# Patient Record
Sex: FEMALE | Race: WHITE
[De-identification: names, ages, dates, MRNs, and addresses within clinical notes are randomized per-mention and may not be internally consistent; named-entity substitution may affect disease eponyms.]

---

## 2020-05-21 ENCOUNTER — HOSPITAL ENCOUNTER (INPATIENT)
Dept: HOSPITAL 76 - ED | Age: 75
LOS: 1 days | DRG: 871 | End: 2020-05-22
Attending: INTERNAL MEDICINE | Admitting: INTERNAL MEDICINE
Payer: MEDICARE

## 2020-05-21 ENCOUNTER — HOSPITAL ENCOUNTER (OUTPATIENT)
Dept: HOSPITAL 76 - EMS | Age: 75
Discharge: ANOTHER HOSPITAL | End: 2020-05-21
Attending: SURGERY
Payer: MEDICARE

## 2020-05-21 DIAGNOSIS — I45.10: ICD-10-CM

## 2020-05-21 DIAGNOSIS — R79.89: ICD-10-CM

## 2020-05-21 DIAGNOSIS — E87.1: ICD-10-CM

## 2020-05-21 DIAGNOSIS — N17.9: ICD-10-CM

## 2020-05-21 DIAGNOSIS — R65.21: ICD-10-CM

## 2020-05-21 DIAGNOSIS — Z91.81: ICD-10-CM

## 2020-05-21 DIAGNOSIS — M21.371: ICD-10-CM

## 2020-05-21 DIAGNOSIS — Z66: ICD-10-CM

## 2020-05-21 DIAGNOSIS — L89.153: ICD-10-CM

## 2020-05-21 DIAGNOSIS — J98.11: ICD-10-CM

## 2020-05-21 DIAGNOSIS — E87.2: ICD-10-CM

## 2020-05-21 DIAGNOSIS — R57.1: ICD-10-CM

## 2020-05-21 DIAGNOSIS — Z74.09: ICD-10-CM

## 2020-05-21 DIAGNOSIS — L89.159: ICD-10-CM

## 2020-05-21 DIAGNOSIS — L89.152: ICD-10-CM

## 2020-05-21 DIAGNOSIS — I10: ICD-10-CM

## 2020-05-21 DIAGNOSIS — R06.00: ICD-10-CM

## 2020-05-21 DIAGNOSIS — A40.8: Primary | ICD-10-CM

## 2020-05-21 DIAGNOSIS — M62.82: ICD-10-CM

## 2020-05-21 DIAGNOSIS — A40.0: ICD-10-CM

## 2020-05-21 DIAGNOSIS — Z87.891: ICD-10-CM

## 2020-05-21 DIAGNOSIS — R53.1: Primary | ICD-10-CM

## 2020-05-21 DIAGNOSIS — K44.9: ICD-10-CM

## 2020-05-21 DIAGNOSIS — R00.1: ICD-10-CM

## 2020-05-21 DIAGNOSIS — N39.0: ICD-10-CM

## 2020-05-21 LAB
ALBUMIN DIAFP-MCNC: 3.1 G/DL (ref 3.2–5.5)
ALBUMIN/GLOB SERPL: 0.9 {RATIO} (ref 1–2.2)
ALP SERPL-CCNC: 77 IU/L (ref 42–121)
ALT SERPL W P-5'-P-CCNC: 136 IU/L (ref 10–60)
AMPHET UR QL SCN: NEGATIVE
ANION GAP SERPL CALCULATED.4IONS-SCNC: 15 MMOL/L (ref 6–13)
APTT PPP: 30.1 SECS (ref 24.9–33.3)
AST SERPL W P-5'-P-CCNC: 458 IU/L (ref 10–42)
BASOPHILS NFR BLD AUTO: 0.1 10^3/UL (ref 0–0.1)
BASOPHILS NFR BLD AUTO: 0.5 %
BENZODIAZ UR QL SCN: NEGATIVE
BILIRUB BLD-MCNC: 1.4 MG/DL (ref 0.2–1)
BILIRUB UR QL CFM: NEGATIVE
BUN SERPL-MCNC: 31 MG/DL (ref 6–20)
CALCIUM UR-MCNC: 8.6 MG/DL (ref 8.5–10.3)
CASTS URNS QL MICRO: (no result) /LPF
CHLORIDE SERPL-SCNC: 97 MMOL/L (ref 101–111)
CK SERPL-CCNC: (no result) IU/L (ref 22–269)
CLARITY UR REFRACT.AUTO: (no result)
CO2 SERPL-SCNC: 18 MMOL/L (ref 21–32)
COCAINE UR-SCNC: NEGATIVE UMOL/L
CREAT SERPLBLD-SCNC: 1.5 MG/DL (ref 0.4–1)
EOSINOPHIL # BLD AUTO: 0.1 10^3/UL (ref 0–0.7)
EOSINOPHIL NFR BLD AUTO: 0.3 %
ERYTHROCYTE [DISTWIDTH] IN BLOOD BY AUTOMATED COUNT: 18.8 % (ref 12–15)
GLOBULIN SER-MCNC: 3.4 G/DL (ref 2.1–4.2)
GLUCOSE SERPL-MCNC: 122 MG/DL (ref 70–100)
GLUCOSE UR QL STRIP.AUTO: NEGATIVE MG/DL
HGB UR QL STRIP: 12.7 G/DL (ref 12–16)
INR PPP: 1.5 (ref 0.8–1.2)
KETONES UR QL STRIP.AUTO: (no result) MG/DL
LIPASE SERPL-CCNC: 28 U/L (ref 22–51)
LYMPHOCYTES # SPEC AUTO: 0.6 10^3/UL (ref 1.5–3.5)
LYMPHOCYTES NFR BLD AUTO: 1.8 %
MCH RBC QN AUTO: 24.2 PG (ref 27–31)
MCHC RBC AUTO-ENTMCNC: 32.8 G/DL (ref 32–36)
MCV RBC AUTO: 73.9 FL (ref 81–99)
METHADONE UR QL SCN: NEGATIVE
METHAMPHET UR QL SCN: NEGATIVE
MONOCYTES # BLD AUTO: 1 10^3/UL (ref 0–1)
MONOCYTES NFR BLD AUTO: 3.4 %
NEUTROPHILS # BLD AUTO: 28.5 10^3/UL (ref 1.5–6.6)
NEUTROPHILS # SNV AUTO: 30.6 X10^3/UL (ref 4.8–10.8)
NEUTROPHILS NFR BLD AUTO: 93.1 %
NITRITE UR QL STRIP.AUTO: NEGATIVE
OPIATES UR QL SCN: NEGATIVE
PDW BLD AUTO: 8.9 FL (ref 7.9–10.8)
PH UR STRIP.AUTO: 6.5 PH (ref 5–7.5)
PLAT MORPH BLD: (no result)
PLATELET # BLD: 485 10^3/UL (ref 130–450)
PLATELET BLD QL SMEAR: (no result)
PROT SPEC-MCNC: 6.5 G/DL (ref 6.7–8.2)
PROT UR STRIP.AUTO-MCNC: 100 MG/DL
PROTHROM ACT/NOR PPP: 17.2 SECS (ref 9.9–12.6)
RBC # UR STRIP.AUTO: (no result) /UL
RBC # URNS HPF: (no result) /HPF (ref 0–5)
RBC MAR: 5.24 10^6/UL (ref 4.2–5.4)
RBC MORPH BLD: (no result)
SODIUM SERPLBLD-SCNC: 130 MMOL/L (ref 135–145)
SP GR UR STRIP.AUTO: 1.01 (ref 1–1.03)
SQUAMOUS URNS QL MICRO: (no result)
UROBILINOGEN UR QL STRIP.AUTO: (no result) E.U./DL
UROBILINOGEN UR STRIP.AUTO-MCNC: NEGATIVE MG/DL
VOLATILE DRUGS POS SERPL SCN: (no result)

## 2020-05-21 PROCEDURE — 96365 THER/PROPH/DIAG IV INF INIT: CPT

## 2020-05-21 PROCEDURE — 74018 RADEX ABDOMEN 1 VIEW: CPT

## 2020-05-21 PROCEDURE — 93306 TTE W/DOPPLER COMPLETE: CPT

## 2020-05-21 PROCEDURE — 83605 ASSAY OF LACTIC ACID: CPT

## 2020-05-21 PROCEDURE — 93005 ELECTROCARDIOGRAM TRACING: CPT

## 2020-05-21 PROCEDURE — 85025 COMPLETE CBC W/AUTO DIFF WBC: CPT

## 2020-05-21 PROCEDURE — 87086 URINE CULTURE/COLONY COUNT: CPT

## 2020-05-21 PROCEDURE — 80048 BASIC METABOLIC PNL TOTAL CA: CPT

## 2020-05-21 PROCEDURE — 84484 ASSAY OF TROPONIN QUANT: CPT

## 2020-05-21 PROCEDURE — 85730 THROMBOPLASTIN TIME PARTIAL: CPT

## 2020-05-21 PROCEDURE — 99291 CRITICAL CARE FIRST HOUR: CPT

## 2020-05-21 PROCEDURE — 99285 EMERGENCY DEPT VISIT HI MDM: CPT

## 2020-05-21 PROCEDURE — 36415 COLL VENOUS BLD VENIPUNCTURE: CPT

## 2020-05-21 PROCEDURE — 94770: CPT

## 2020-05-21 PROCEDURE — 96368 THER/DIAG CONCURRENT INF: CPT

## 2020-05-21 PROCEDURE — 80306 DRUG TEST PRSMV INSTRMNT: CPT

## 2020-05-21 PROCEDURE — 83735 ASSAY OF MAGNESIUM: CPT

## 2020-05-21 PROCEDURE — 80053 COMPREHEN METABOLIC PANEL: CPT

## 2020-05-21 PROCEDURE — 82550 ASSAY OF CK (CPK): CPT

## 2020-05-21 PROCEDURE — 71045 X-RAY EXAM CHEST 1 VIEW: CPT

## 2020-05-21 PROCEDURE — 87181 SC STD AGAR DILUTION PER AGT: CPT

## 2020-05-21 PROCEDURE — 80320 DRUG SCREEN QUANTALCOHOLS: CPT

## 2020-05-21 PROCEDURE — 87040 BLOOD CULTURE FOR BACTERIA: CPT

## 2020-05-21 PROCEDURE — 81001 URINALYSIS AUTO W/SCOPE: CPT

## 2020-05-21 PROCEDURE — 83690 ASSAY OF LIPASE: CPT

## 2020-05-21 PROCEDURE — 94002 VENT MGMT INPAT INIT DAY: CPT

## 2020-05-21 PROCEDURE — 83880 ASSAY OF NATRIURETIC PEPTIDE: CPT

## 2020-05-21 PROCEDURE — 85610 PROTHROMBIN TIME: CPT

## 2020-05-21 PROCEDURE — 81003 URINALYSIS AUTO W/O SCOPE: CPT

## 2020-05-21 PROCEDURE — 82803 BLOOD GASES ANY COMBINATION: CPT

## 2020-05-21 PROCEDURE — 87150 DNA/RNA AMPLIFIED PROBE: CPT

## 2020-05-21 PROCEDURE — 87077 CULTURE AEROBIC IDENTIFY: CPT

## 2020-05-21 PROCEDURE — 84100 ASSAY OF PHOSPHORUS: CPT

## 2020-05-21 PROCEDURE — 70450 CT HEAD/BRAIN W/O DYE: CPT

## 2020-05-21 NOTE — ED PHYSICIAN DOCUMENTATION
History of Present Illness





- Stated complaint


Stated Complaint: GLF, WEAKNESS





- Chief complaint


Chief Complaint: General





- History obtained from


History obtained from: Patient, Family (Primarily history is obtained from EMS 

as well as the patient and the patient's daughter.  The patient is a 75-year-old

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 

injury for which she uses a walking boot her baseline ability of mobility is to 

ambulate with a walker.  She does live at home by herselfWas walking with her 

walker when she slipped and fell down she remained on the floor for 

approximately 16 hours or so prior to arrival the patient and then called her 

daughter finally who did call the ambulance eventually brought her here to the 

emergency department. EMS and the daughter and the patient denies any recent 

believe that she had any long bone fracture such as a hip or upper extremity 

fracture.  At the daughter reports that she has a chronic decubitus ulcer that 

is never been addressed medically that usually about the size of a nickel or a 

quarter and reports it is eccentrically expanded very aggressively and reports 

low-grade fevers and overall not feeling well recently.The patient denies any 

chest pain or any shortness of breath.), EMS





Review of Systems


Constitutional: reports: Chills, Myalgias


Eyes: reports: Reviewed and negative


Ears: reports: Reviewed and negative


Nose: reports: Reviewed and negative


Throat: reports: Reviewed and negative


Cardiac: reports: Reviewed and negative


Respiratory: reports: Reviewed and negative


GI: reports: Reviewed and negative


: reports: Reviewed and negative


Skin: reports: Other (Sacral decubitus ulcer)


Musculoskeletal: reports: Other (Sacral decubitus ulcer)


Neurologic: reports: Other (Generalized weakness)


Psychiatric: reports: Reviewed and negative


Endocrine: reports: Reviewed and negative


Immunocompromised: reports: Reviewed and negative





PD PAST MEDICAL HISTORY





- Allergies


Allergies/Adverse Reactions: 


                                    Allergies











Allergy/AdvReac Type Severity Reaction Status Date / Time


 


No Known Drug Allergies Allergy   Verified 05/21/20 20:32














PD ED PE NORMAL





- Vitals


Vital signs reviewed: Yes





- General


General: Alert and oriented X 3, No acute distress





- HEENT


HEENT: PERRL





- Neck


Neck: Supple, no meningeal sign





- Cardiac


Cardiac: RRR, No murmur, Strong equal pulses





- Respiratory


Respiratory: No respiratory distress, Clear bilaterally





- Abdomen


Abdomen: Normal bowel sounds, Soft, Non tender, Non distended





- Derm


Derm: Warm and dry, Other (Large sacral decubitus ulcer Approximately 8 x 8 cm 

stage II)





- Extremities


Extremities: No deformity, Other (Chronic right lower extremity dropfoot)





- Neuro


Neuro: Alert and oriented X 3, CNs 2-12 intact, No motor deficit, No sensory 

deficit, Normal speech, Other (Chronic right lower extremity dropfoot)





- Psych


Psych: Normal mood, Normal affect





Results





- Vitals


Vitals: 


                               Vital Signs - 24 hr











  05/21/20 05/21/20 05/21/20





  20:20 20:32 22:10


 


Temperature 36.7 C  


 


Heart Rate 101 H 95 91


 


Respiratory 22 30 H 35 H





Rate   


 


Blood Pressure 61/41 L 77/65 L 67/59 L


 


O2 Saturation 96 93 93














  05/21/20 05/21/20 05/21/20





  22:25 22:30 23:00


 


Temperature   


 


Heart Rate 88 88 85


 


Respiratory 16 25 H 25 H





Rate   


 


Blood Pressure 99/67 123/73 125/83 H


 


O2 Saturation 95 93 95








                                     Oxygen











O2 Source                      Nasal cannula


 


Oxygen Flow Rate               2

















- EKG (time done)


  ** 00:00


Rate: Other (no stemi)





- Labs


Labs: 


                                Laboratory Tests











  05/21/20 05/21/20 05/21/20





  20:40 20:40 20:40


 


WBC  30.6 H  


 


RBC  5.24  


 


Hgb  12.7  


 


Hct  38.7  


 


MCV  73.9 L  


 


MCH  24.2 L  


 


MCHC  32.8  


 


RDW  18.8 H  


 


Plt Count  485 H  


 


MPV  8.9  


 


Neut # (Auto)  28.5 H  


 


Lymph # (Auto)  0.6 L  


 


Mono # (Auto)  1.0  


 


Eos # (Auto)  0.1  


 


Baso # (Auto)  0.1  


 


Absolute Nucleated RBC  0.00  


 


Total Counted  NP  


 


Band Neuts % (Manual)  NP  


 


Abnorm Lymph % (Manual)  NP  


 


Nucleated RBC %  0.0  


 


Neutrophils # (Manual)  NP  


 


Lymphocytes # (Manual)  NP  


 


Monocytes # (Manual)  NP  


 


Eosinophils # (Manual)  NP  


 


Basophils # (Manual)  NP  


 


Platelet Estimate  INCREASED (>450,000)  


 


Platelet Morphology  NORMAL APPEARANCE  


 


RBC Morph Micro Appear  NORMAL APPEARANCE  


 


PT   17.2 H 


 


INR   1.5 H 


 


APTT   30.1 


 


Sodium    130 L


 


Potassium    4.2


 


Chloride    97 L


 


Carbon Dioxide    18 L


 


Anion Gap    15.0 H


 


BUN    31 H


 


Creatinine    1.5 H


 


Estimated GFR (MDRD)    34 L


 


Glucose    122 H


 


Lactic Acid   


 


Calcium    8.6


 


Total Bilirubin    1.4 H


 


AST    458 H


 


ALT    136 H


 


Alkaline Phosphatase    77


 


Total Creatine Kinase    45451 H*


 


Troponin I High Sens   


 


B-Natriuretic Peptide   


 


Total Protein    6.5 L


 


Albumin    3.1 L


 


Globulin    3.4


 


Albumin/Globulin Ratio    0.9 L


 


Lipase    28


 


Urine Color   


 


Urine Clarity   


 


Urine pH   


 


Ur Specific Gravity   


 


Urine Protein   


 


Urine Glucose (UA)   


 


Urine Ketones   


 


Urine Occult Blood   


 


Urine Nitrite   


 


Urine Bilirubin   


 


Urine Urobilinogen   


 


Ur Leukocyte Esterase   


 


Urine RBC   


 


Urine WBC   


 


Ur Squamous Epith Cells   


 


Urine Bacteria   


 


Urine Casts   


 


Ur Microscopic Review   


 


Urine Culture Comments   


 


Urine Opiates Screen   


 


Ur Oxycodone Screen   


 


Urine Methadone Screen   


 


Ur Propoxyphene Screen   


 


Ur Barbiturates Screen   


 


Ur Tricyclics Screen   


 


Ur Phencyclidine Scrn   


 


Ur Amphetamine Screen   


 


U Methamphetamines Scrn   


 


U Benzodiazepines Scrn   


 


Urine Cocaine Screen   


 


U Cannabinoids Screen   


 


Ethyl Alcohol    < 5.0














  05/21/20 05/21/20 05/21/20





  20:40 20:40 20:40


 


WBC   


 


RBC   


 


Hgb   


 


Hct   


 


MCV   


 


MCH   


 


MCHC   


 


RDW   


 


Plt Count   


 


MPV   


 


Neut # (Auto)   


 


Lymph # (Auto)   


 


Mono # (Auto)   


 


Eos # (Auto)   


 


Baso # (Auto)   


 


Absolute Nucleated RBC   


 


Total Counted   


 


Band Neuts % (Manual)   


 


Abnorm Lymph % (Manual)   


 


Nucleated RBC %   


 


Neutrophils # (Manual)   


 


Lymphocytes # (Manual)   


 


Monocytes # (Manual)   


 


Eosinophils # (Manual)   


 


Basophils # (Manual)   


 


Platelet Estimate   


 


Platelet Morphology   


 


RBC Morph Micro Appear   


 


PT   


 


INR   


 


APTT   


 


Sodium   


 


Potassium   


 


Chloride   


 


Carbon Dioxide   


 


Anion Gap   


 


BUN   


 


Creatinine   


 


Estimated GFR (MDRD)   


 


Glucose   


 


Lactic Acid   3.2 H* 


 


Calcium   


 


Total Bilirubin   


 


AST   


 


ALT   


 


Alkaline Phosphatase   


 


Total Creatine Kinase   


 


Troponin I High Sens    236.3 H*


 


B-Natriuretic Peptide  230 H  


 


Total Protein   


 


Albumin   


 


Globulin   


 


Albumin/Globulin Ratio   


 


Lipase   


 


Urine Color   


 


Urine Clarity   


 


Urine pH   


 


Ur Specific Gravity   


 


Urine Protein   


 


Urine Glucose (UA)   


 


Urine Ketones   


 


Urine Occult Blood   


 


Urine Nitrite   


 


Urine Bilirubin   


 


Urine Urobilinogen   


 


Ur Leukocyte Esterase   


 


Urine RBC   


 


Urine WBC   


 


Ur Squamous Epith Cells   


 


Urine Bacteria   


 


Urine Casts   


 


Ur Microscopic Review   


 


Urine Culture Comments   


 


Urine Opiates Screen   


 


Ur Oxycodone Screen   


 


Urine Methadone Screen   


 


Ur Propoxyphene Screen   


 


Ur Barbiturates Screen   


 


Ur Tricyclics Screen   


 


Ur Phencyclidine Scrn   


 


Ur Amphetamine Screen   


 


U Methamphetamines Scrn   


 


U Benzodiazepines Scrn   


 


Urine Cocaine Screen   


 


U Cannabinoids Screen   


 


Ethyl Alcohol   














  05/21/20





  21:19


 


WBC 


 


RBC 


 


Hgb 


 


Hct 


 


MCV 


 


MCH 


 


MCHC 


 


RDW 


 


Plt Count 


 


MPV 


 


Neut # (Auto) 


 


Lymph # (Auto) 


 


Mono # (Auto) 


 


Eos # (Auto) 


 


Baso # (Auto) 


 


Absolute Nucleated RBC 


 


Total Counted 


 


Band Neuts % (Manual) 


 


Abnorm Lymph % (Manual) 


 


Nucleated RBC % 


 


Neutrophils # (Manual) 


 


Lymphocytes # (Manual) 


 


Monocytes # (Manual) 


 


Eosinophils # (Manual) 


 


Basophils # (Manual) 


 


Platelet Estimate 


 


Platelet Morphology 


 


RBC Morph Micro Appear 


 


PT 


 


INR 


 


APTT 


 


Sodium 


 


Potassium 


 


Chloride 


 


Carbon Dioxide 


 


Anion Gap 


 


BUN 


 


Creatinine 


 


Estimated GFR (MDRD) 


 


Glucose 


 


Lactic Acid 


 


Calcium 


 


Total Bilirubin 


 


AST 


 


ALT 


 


Alkaline Phosphatase 


 


Total Creatine Kinase 


 


Troponin I High Sens 


 


B-Natriuretic Peptide 


 


Total Protein 


 


Albumin 


 


Globulin 


 


Albumin/Globulin Ratio 


 


Lipase 


 


Urine Color  YELLOW


 


Urine Clarity  SL. CLOUDY


 


Urine pH  6.5


 


Ur Specific Gravity  1.015


 


Urine Protein  100 H


 


Urine Glucose (UA)  NEGATIVE


 


Urine Ketones  TRACE


 


Urine Occult Blood  LARGE H


 


Urine Nitrite  NEGATIVE


 


Urine Bilirubin  NEGATIVE


 


Urine Urobilinogen  1 (NORMAL)


 


Ur Leukocyte Esterase  LARGE H


 


Urine RBC  6-10 H


 


Urine WBC  >25 H


 


Ur Squamous Epith Cells  RARE Squamous


 


Urine Bacteria  Moderate H


 


Urine Casts  3-5 Hyaline Casts


 


Ur Microscopic Review  INDICATED


 


Urine Culture Comments  INDICATED


 


Urine Opiates Screen  NEGATIVE


 


Ur Oxycodone Screen  NEGATIVE


 


Urine Methadone Screen  NEGATIVE


 


Ur Propoxyphene Screen  NEGATIVE


 


Ur Barbiturates Screen  NEGATIVE


 


Ur Tricyclics Screen  NEGATIVE


 


Ur Phencyclidine Scrn  NEGATIVE


 


Ur Amphetamine Screen  NEGATIVE


 


U Methamphetamines Scrn  NEGATIVE


 


U Benzodiazepines Scrn  NEGATIVE


 


Urine Cocaine Screen  NEGATIVE


 


U Cannabinoids Screen  NEGATIVE


 


Ethyl Alcohol 














PD MEDICAL DECISION MAKING





- ED course


Complexity details: reviewed old records, reviewed results, re-evaluated 

patient, considered differential (Given the patient's large decubitus ulcer and 

overall weakness an extended period of down time her history and exam are 

concerning for rhabdomyolysis as well as infectious etiology such as bacteremia 

her work-up is consistent with an infectious origin she was given empiric IV 

vancomycin as well as Zosyn as well as multiple fluid boluses her blood pressure

did improve significantly the patient will be admitted to the hospitalist.), d/w

patient, d/w family





- Consults


Consults: Discussed case with (dr. resendiz. will admit.)





- Critical Care


Time(min): 30


Time Includes: Direct patient care, Review records, Reassess patient, Document 

care, Coordinate care, Medical consult, Family consult for tx dec


Data interpretation: Labs, Pulse ox, CXR


Procedures included in critical care time: Peripheral IV


Procedures excluded from critical care time: EKG





Departure





- Departure


Disposition: [facility] DC/Xfer


Clinical Impression: 


 Weakness, Sacral decubitus ulcer, stage II, Acute kidney injury, Hyponatremia, 

Elevated troponin





Rhabdomyolysis


Qualifiers:


 Rhabdomyolysis type: non-traumatic Qualified Code(s): M62.82 - Rhabdomyolysis





Condition: Stable


Discharge Date/Time: 05/21/20 23:45

## 2020-05-21 NOTE — CT REPORT
Reason:  ams

Procedure Date:  05/21/2020   

Accession Number:  [id number] / [id number]                    

Procedure:  CT  - HEAD WO CPT Code:  

 

***Final Report***

 

 

FULL RESULT:

 

 

EXAM:

CT HEAD

 

EXAM DATE: 5/21/2020 10:02 PM.

 

CLINICAL HISTORY: Altered mental status.

 

COMPARISON: None.

 

TECHNIQUE: Multiaxial CT images were obtained from the foramen magnum to 

the vertex. Reformats: Sagittal and coronal. IV contrast: None.

 

In accordance with CT protocol optimization, one or more of the following 

dose reduction techniques were utilized for this exam: automated exposure 

control, adjustment of mA and/or KV based on patient size, or use of 

iterative reconstructive technique.

 

FINDINGS:

Parenchyma: No mass-effect or midline shift. No evidence for edema. No 

evidence for acute intracranial hemorrhage. Diffuse chronic 

microangiopathic white matter changes are evident.

 

Extraaxial Spaces: Normal for age. No subdural or epidural collections 

identified.

 

Ventricles: The ventricles and cortical sulci are enlarged, consistent 

with age-related tissue loss.

 

Sinuses and orbits: Imaged paranasal sinuses, orbits, and mastoids show 

no significant abnormality.

 

Bones: No evidence of fracture or calvarial defect.

IMPRESSION: Generalized age-related cortical atrophic changes without 

evidence of acute intracranial abnormality.

 

[facility]
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
## 2020-05-21 NOTE — XRAY REPORT
Reason:  cp

Procedure Date:  05/21/2020   

Accession Number:  [id number] / [id number]                    

Procedure:  XR  - Chest 1 View X-Ray CPT Code:  04976

 

***Final Report***

 

 

FULL RESULT:

 

 

EXAM:

CHEST RADIOGRAPHY

 

EXAM DATE: 5/21/2020 10:25 PM.

 

CLINICAL HISTORY: Cp.

 

COMPARISON: XR CHEST 1 VIEWS 03/22/2009 12:42 PM.

 

TECHNIQUE: 1 view.

 

FINDINGS:

Lungs/Pleura: No focal opacities evident. No pleural effusion. No 

pneumothorax.

 

Mediastinum: Within exam limitations, the cardiomediastinal contour is 

normal.

 

Other: Degenerative changes at the right humeral joint.

IMPRESSION: No acute infiltrates.

 

[facility]

## 2020-05-22 VITALS — DIASTOLIC BLOOD PRESSURE: 52 MMHG | SYSTOLIC BLOOD PRESSURE: 74 MMHG

## 2020-05-22 LAB
ANION GAP SERPL CALCULATED.4IONS-SCNC: 14 MMOL/L (ref 6–13)
ARTERIAL PATENCY WRIST A: (no result)
BASE EXCESS BLDMV CALC-SCNC: -20 MMOL/L (ref -2–3)
BASE EXCESS BLDMV CALC-SCNC: -22.1 MMOL/L (ref -2–3)
BASE EXCESS BLDMV CALC-SCNC: -23.2 MMOL/L (ref -2–3)
BASOPHILS NFR BLD AUTO: 0.1 10^3/UL (ref 0–0.1)
BASOPHILS NFR BLD AUTO: 0.5 %
BUN SERPL-MCNC: 37 MG/DL (ref 6–20)
CALCIUM UR-MCNC: 7.9 MG/DL (ref 8.5–10.3)
CHLORIDE SERPL-SCNC: 106 MMOL/L (ref 101–111)
CO2 BLDA CALC-SCNC: 10.3 MMOL/L (ref 21–29)
CO2 BLDA CALC-SCNC: 9.4 MMOL/L (ref 21–29)
CO2 BLDA CALC-SCNC: 9.9 MMOL/L (ref 21–29)
CO2 SERPL-SCNC: 15 MMOL/L (ref 21–32)
CREAT SERPLBLD-SCNC: 1.2 MG/DL (ref 0.4–1)
DEPRECATED HCO3 PLAS-SCNC: 8.2 MMOL/L (ref 22–26)
DEPRECATED HCO3 PLAS-SCNC: 8.7 MMOL/L (ref 22–26)
DEPRECATED HCO3 PLAS-SCNC: 9.3 MMOL/L (ref 22–26)
EOSINOPHIL # BLD AUTO: 0.1 10^3/UL (ref 0–0.7)
EOSINOPHIL NFR BLD AUTO: 0.5 %
ERYTHROCYTE [DISTWIDTH] IN BLOOD BY AUTOMATED COUNT: 20 % (ref 12–15)
FIO2: 0.5
FIO2: 0.6
FIO2: 1
GLUCOSE SERPL-MCNC: 91 MG/DL (ref 70–100)
HGB UR QL STRIP: 13.7 G/DL (ref 12–16)
LYMPHOCYTES # SPEC AUTO: 0.5 10^3/UL (ref 1.5–3.5)
LYMPHOCYTES NFR BLD AUTO: 2.2 %
MAGNESIUM SERPL-MCNC: 2.3 MG/DL (ref 1.7–2.8)
MCH RBC QN AUTO: 24 PG (ref 27–31)
MCHC RBC AUTO-ENTMCNC: 31 G/DL (ref 32–36)
MCV RBC AUTO: 77.5 FL (ref 81–99)
MONOCYTES # BLD AUTO: 0.9 10^3/UL (ref 0–1)
MONOCYTES NFR BLD AUTO: 3.8 %
NEUTROPHILS # BLD AUTO: 21.1 10^3/UL (ref 1.5–6.6)
NEUTROPHILS # SNV AUTO: 22.8 X10^3/UL (ref 4.8–10.8)
NEUTROPHILS NFR BLD AUTO: 92.3 %
PCO2 TEMP ADJ BLDCOA: 34 MMHG (ref 34–45)
PCO2 TEMP ADJ BLDCOA: 39 MMHG (ref 34–45)
PCO2 TEMP ADJ BLDCOA: 39 MMHG (ref 34–45)
PDW BLD AUTO: 9.4 FL (ref 7.9–10.8)
PH TEMP ADJ BLDA: 6.94 [PH] (ref 7.35–7.45)
PH TEMP ADJ BLDA: 6.97 [PH] (ref 7.35–7.45)
PH TEMP ADJ BLDA: 7.05 [PH] (ref 7.35–7.45)
PHOSPHATE BLD-MCNC: 4.9 MG/DL (ref 2.5–4.6)
PLAT MORPH BLD: (no result)
PLATELET # BLD: 376 10^3/UL (ref 130–450)
PLATELET BLD QL SMEAR: (no result)
PO2 TEMP ADJ BLDCOA: 120 MMHG (ref 80–100)
PO2 TEMP ADJ BLDCOA: 238 MMHG (ref 80–100)
PO2 TEMP ADJ BLDCOA: 94 MMHG (ref 80–100)
RBC MAR: 5.7 10^6/UL (ref 4.2–5.4)
RBC MORPH BLD: (no result)
SAO2 % BLDA FROM PO2: 93 % (ref 94–98)
SAO2 % BLDA FROM PO2: 96 % (ref 94–98)
SAO2 % BLDA FROM PO2: 99 % (ref 94–98)
SODIUM SERPLBLD-SCNC: 135 MMOL/L (ref 135–145)

## 2020-05-22 PROCEDURE — 0BH17EZ INSERTION OF ENDOTRACHEAL AIRWAY INTO TRACHEA, VIA NATURAL OR ARTIFICIAL OPENING: ICD-10-PCS | Performed by: NURSE ANESTHETIST, CERTIFIED REGISTERED

## 2020-05-22 PROCEDURE — 02HV33Z INSERTION OF INFUSION DEVICE INTO SUPERIOR VENA CAVA, PERCUTANEOUS APPROACH: ICD-10-PCS | Performed by: NURSE ANESTHETIST, CERTIFIED REGISTERED

## 2020-05-22 PROCEDURE — 5A1935Z RESPIRATORY VENTILATION, LESS THAN 24 CONSECUTIVE HOURS: ICD-10-PCS | Performed by: INTERNAL MEDICINE

## 2020-05-22 PROCEDURE — 03HY32Z INSERTION OF MONITORING DEVICE INTO UPPER ARTERY, PERCUTANEOUS APPROACH: ICD-10-PCS | Performed by: NURSE ANESTHETIST, CERTIFIED REGISTERED

## 2020-05-22 RX ADMIN — SODIUM CHLORIDE SCH MLS/HR: 9 INJECTION, SOLUTION INTRAVENOUS at 06:47

## 2020-05-22 RX ADMIN — SODIUM CHLORIDE SCH MLS/HR: 9 INJECTION, SOLUTION INTRAVENOUS at 10:00

## 2020-05-22 RX ADMIN — SODIUM CHLORIDE SCH MLS/HR: 9 INJECTION, SOLUTION INTRAVENOUS at 01:40

## 2020-05-22 RX ADMIN — SODIUM CHLORIDE SCH MLS/HR: 9 INJECTION, SOLUTION INTRAVENOUS at 00:45

## 2020-05-22 RX ADMIN — SODIUM CHLORIDE, PRESERVATIVE FREE SCH ML: 5 INJECTION INTRAVENOUS at 00:30

## 2020-05-22 RX ADMIN — SODIUM CHLORIDE, PRESERVATIVE FREE SCH ML: 5 INJECTION INTRAVENOUS at 11:10

## 2020-05-22 RX ADMIN — SODIUM CHLORIDE, PRESERVATIVE FREE SCH ML: 5 INJECTION INTRAVENOUS at 14:32

## 2020-05-22 NOTE — PROVIDER PROGRESS NOTE
Assessment/Plan





- Problem List


(1) Septic shock


Assessment/Plan: 


Blood pressure improved somewhat after the first 3 L of crystalloids given since

the ER.


Her blood pressure is trending downward with tachycardia.


We will recheck lactic acid level, troponin and continue with aggressive volume 

replace and treat the UTI.


A repeat chest x-ray today showed no infiltrates.  There was question of free 

air however.


The [facility] radiologist wanted a left lateral decubitus film to rule out 

intraperitoneal air which was negative but reported a large loop of bowel which 

is in the thoracic cavity, Consistent with a large diaphragmatic hernia causing 

a atelectasis on the left side.


Her temperature is starting to become febrile. Cancel the bear hugger.


Will order blood cultures, if not yet done.


Will widen her antibiotics to cover anaerobes, using Flagyl, continue Zosyn and 

Vanco mycin empirically.


Follow WBC daily.








(2) Dyspnea


Assessment/Plan: 


At approx 0845 pt became suddenly SOB and diaphoretic, she denied any pain and 

was too SOB to converse.


HR was 120 in sinus tachy, , O2 sat 82% and improved to 92% on O2 by n.c.,

she had diminished breath sounds.


A STAT CXR was done and showed no pulmionary edema


A STAT EKG was done and showed Sinus tach, RBBB.


She had already received 3L of fluids in just 10 hours, so fluids were 

decreased.


Morphine iv administered.


Troponins and BNP ordered.








(3) UTI (urinary tract infection)


Assessment/Plan: 


Urine is growinmg GPC.


Continue Zosyn and Vanco. Add Flagyl.


She did not have a CT of the abdomen to evaluate for pyelonephritis.








(4) Sacral decubitus ulcer, stage II


Assessment/Plan: 


She cannot have an MRI because of rods in her spine.  A CT scan of the pelvis 

was ordered therefore to rule out osteomyelitis.


She cannot have the CT scan yet with her current critical condition, hypotensive

and unstable.


[place]ound consult from [facility] [place] is pending today.








(5) Elevated troponin


Assessment/Plan: 


Will recycle troponins, to R/O acute MI. Follow BNP.


The CXR did not show pulmonary edema.


Echo was done and showed Small chamber sizes, marked LVH, hyperdynamic LVEF.  

All this is consistent with volume depletion.








(6) Rhabdomyolysis


Qualifiers: 


   Rhabdomyolysis type: non-traumatic   Qualified Code(s): M62.82 - 

Rhabdomyolysis   


Assessment/Plan: 


CK of 20,000 has decreased to 16,000.


Chepe with IV fluids.


Follow her CK daily.








(7) Acute kidney injury


Assessment/Plan: 


Continue with hydration and medications to improve perfusion.


Avoid nephrotoxins. Watch Vanco levels.


Follow BMP daily








(8) Fall at home


Assessment/Plan: 


Preceding this was weakness and urinary frequency and enlargement of the sacral 

decubitus size rapidly and confusion.  Very likely hypotension was the cause of 

her fall.








(9) Foot drop, right


Assessment/Plan: 


Chronic, per Hx.








(10) Hyponatremia


Assessment/Plan: 


Resolved with saline iv hydration.








- Current Meds


Current Meds: 





                               Current Medications











Generic Name Dose Route Start Last Admin





  Trade Name Freq  PRN Reason Stop Dose Admin


 


Acetaminophen  650 mg  05/21/20 23:10  05/22/20 06:38





  Tylenol  PO   650 mg





  Q4HR PRN   Administration





  Pain 1 to 4   





     





     





     


 


Sodium Chloride  1,000 mls @ 150 mls/hr  05/21/20 23:45  05/22/20 06:47





  Normal Saline 0.9%  IV   150 mls/hr





  .[id number] ZHOU   Administration





     





     





     





     


 


Piperacillin Sod/Tazobactam  100 mls @ 25 mls/hr  05/22/20 02:00  05/22/20 06:00





  Sod 3.375 gm/ Sodium Chloride  IV   Infused





  Q8H ZHOU   Infusion





     





     





     





     


 


Sodium Chloride  10 ml  05/22/20 01:00  05/22/20 00:30





  Normal Saline Flush 0.9%  IVP   10 ml





  0100,0900,1700 ZHOU   Administration





     





     





     





     














- Lab Result


Fish Bone Diagrams: 


                                 05/22/20 05:00





                                 05/22/20 05:00





- Additional Planning


My Orders: 





My Active Orders





05/22/20 11:30


LACTIC ACID, VENOUS [CHEM] Timed 














Subjective





- Subjective


Patient Reports: Shortness of Breath, Other (Diaphoretic.)


Nursing Reports: Confused, Other (She had abdominal pain 1 hour ago which was 

treated with Tylenol and resolved.  Patient is hungry but cannot figure out how 

to feed herself.)





Objective


Vital Signs: 





                               Vital Signs - 24 hr











  05/21/20 05/21/20 05/21/20





  20:20 20:32 22:10


 


Temperature 36.7 C  


 


Heart Rate 101 H 95 91


 


Heart Rate [   





Monitoring   





electrodes]   


 


Respiratory 22 30 H 35 H





Rate   


 


Blood Pressure 61/41 L 77/65 L 67/59 L


 


Blood Pressure   





[Left Brachial   





artery]   


 


Blood Pressure   





[Right Brachial   





artery]   


 


Blood Pressure   





[Right Radial   





artery]   


 


O2 Saturation 96 93 93














  05/21/20 05/21/20 05/21/20





  22:25 22:30 23:00


 


Temperature   


 


Heart Rate 88 88 85


 


Heart Rate [   





Monitoring   





electrodes]   


 


Respiratory 16 25 H 25 H





Rate   


 


Blood Pressure 99/67 123/73 125/83 H


 


Blood Pressure   





[Left Brachial   





artery]   


 


Blood Pressure   





[Right Brachial   





artery]   


 


Blood Pressure   





[Right Radial   





artery]   


 


O2 Saturation 95 93 95














  05/21/20 05/22/20 05/22/20





  23:30 00:00 00:30


 


Temperature  36.8 C 


 


Heart Rate 82  


 


Heart Rate [  84 84





Monitoring   





electrodes]   


 


Respiratory 27 H 24 25 H





Rate   


 


Blood Pressure 116/79  


 


Blood Pressure  107/90 H 





[Left Brachial   





artery]   


 


Blood Pressure   





[Right Brachial   





artery]   


 


Blood Pressure   102/64





[Right Radial   





artery]   


 


O2 Saturation 94 97 100














  05/22/20 05/22/20 05/22/20





  02:00 03:00 04:00


 


Temperature   36.3 C L


 


Heart Rate   


 


Heart Rate [ 91 95 103 H





Monitoring   





electrodes]   


 


Respiratory 26 H 21 24





Rate   


 


Blood Pressure   


 


Blood Pressure   





[Left Brachial   





artery]   


 


Blood Pressure  96/48 L 85/53 L





[Right Brachial   





artery]   


 


Blood Pressure 106/63  





[Right Radial   





artery]   


 


O2 Saturation 97 100 95














  05/22/20 05/22/20 05/22/20





  04:30 06:00 06:56


 


Temperature   37.2 C


 


Heart Rate   


 


Heart Rate [ 93 106 H 





Monitoring   





electrodes]   


 


Respiratory 28 H 26 H 





Rate   


 


Blood Pressure   


 


Blood Pressure   





[Left Brachial   





artery]   


 


Blood Pressure 107/73 92/76 





[Right Brachial   





artery]   


 


Blood Pressure   





[Right Radial   





artery]   


 


O2 Saturation 100 97 














  05/22/20





  07:00


 


Temperature 


 


Heart Rate 


 


Heart Rate [ 110 H





Monitoring 





electrodes] 


 


Respiratory 36 H





Rate 


 


Blood Pressure 


 


Blood Pressure 





[Left Brachial 





artery] 


 


Blood Pressure 121/109 H





[Right Brachial 





artery] 


 


Blood Pressure 





[Right Radial 





artery] 


 


O2 Saturation 100








                                     Oxygen











O2 Source                      Room air


 


Oxygen Flow Rate               2














I&O (Last 24 Hrs): 





                          Intake and Output Totals x24h











 05/20/20 05/21/20 05/22/20





 23:59 23:59 23:59


 


Intake Total  2600 1405.000


 


Output Total   1075


 


Balance  2600 330.000











General: Moderate distress


HEENT: Other (Edentulous, dry mucosa, sunken eyes, cachectic with temporal 

wating, anicteric.)


Neck: Supple, No JVD


Neuro: Disoriented, Other (Moving all extremities. He is able to nod yes and no 

when I ask her questions.)


Cardiovascular: No murmurs, Other (Tachycardic)


Respiratory: Breath sounds nml, Other (No wheezes or rales but shallow breaths 

with very poor air movement diffusely.)


Abdomen: Soft


Rectal: Other (From admission images done by the RN: She has a grade 2 large 

decubitus ulcer.)


Extremities: No edema





- Results


Results: 





                               Laboratory Results











WBC  22.8 x10^3/uL (4.8-10.8)  H  05/22/20  05:00    


 


RBC  5.70 10^6/uL (4.20-5.40)  H  05/22/20  05:00    


 


Hgb  13.7 g/dL (12.0-16.0)   05/22/20  05:00    


 


Hct  44.2 % (37.0-47.0)   05/22/20  05:00    


 


MCV  77.5 fL (81.0-99.0)  L  05/22/20  05:00    


 


MCH  24.0 pg (27.0-31.0)  L  05/22/20  05:00    


 


MCHC  31.0 g/dL (32.0-36.0)  L  05/22/20  05:00    


 


RDW  20.0 % (12.0-15.0)  H  05/22/20  05:00    


 


Plt Count  376 10^3/uL (130-450)   05/22/20  05:00    


 


MPV  9.4 fL (7.9-10.8)   05/22/20  05:00    


 


Neut # (Auto)  21.1 10^3/uL (1.5-6.6)  H  05/22/20  05:00    


 


Lymph # (Auto)  0.5 10^3/uL (1.5-3.5)  L  05/22/20  05:00    


 


Mono # (Auto)  0.9 10^3/uL (0.0-1.0)   05/22/20  05:00    


 


Eos # (Auto)  0.1 10^3/uL (0.0-0.7)   05/22/20  05:00    


 


Baso # (Auto)  0.1 10^3/uL (0.0-0.1)   05/22/20  05:00    


 


Absolute Nucleated RBC  0.00 x10^3/uL  05/22/20  05:00    


 


Total Counted  NP   05/22/20  05:00    


 


Band Neuts % (Manual)  NP   05/22/20  05:00    


 


Abnorm Lymph % (Manual)  NP   05/22/20  05:00    


 


Nucleated RBC %  0.0 /100WBC  05/22/20  05:00    


 


Neutrophils # (Manual)  NP   05/22/20  05:00    


 


Lymphocytes # (Manual)  NP   05/22/20  05:00    


 


Monocytes # (Manual)  NP   05/22/20  05:00    


 


Eosinophils # (Manual)  NP   05/22/20  05:00    


 


Basophils # (Manual)  NP   05/22/20  05:00    


 


Platelet Estimate  NORMAL (130-450,000)  (NORMAL)   05/22/20  05:00    


 


Platelet Morphology  NORMAL APPEARANCE  (NORMAL)   05/22/20  05:00    


 


RBC Morph Micro Appear  NORMAL APPEARANCE  (NORMAL)   05/22/20  05:00    


 


PT  17.2 secs (9.9-12.6)  H  05/21/20  20:40    


 


INR  1.5  (0.8-1.2)  H  05/21/20  20:40    


 


APTT  30.1 secs (24.9-33.3)   05/21/20  20:40    


 


Sodium  135 mmol/L (135-145)   05/22/20  05:00    


 


Potassium  4.3 mmol/L (3.5-5.0)   05/22/20  05:00    


 


Chloride  106 mmol/L (101-111)   05/22/20  05:00    


 


Carbon Dioxide  15 mmol/L (21-32)  L  05/22/20  05:00    


 


Anion Gap  14.0  (6-13)  H  05/22/20  05:00    


 


BUN  37 mg/dL (6-20)  H  05/22/20  05:00    


 


Creatinine  1.2 mg/dL (0.4-1.0)  H  05/22/20  05:00    


 


Estimated GFR (MDRD)  44  (>89)  L  05/22/20  05:00    


 


Glucose  91 mg/dL ()   05/22/20  05:00    


 


Lactic Acid  3.4 mmol/L (0.5-2.2)  H*  05/22/20  07:35    


 


Calcium  7.9 mg/dL (8.5-10.3)  L  05/22/20  05:00    


 


Phosphorus  4.9 mg/dL (2.5-4.6)  H  05/22/20  05:00    


 


Magnesium  2.3 mg/dL (1.7-2.8)   05/22/20  05:00    


 


Total Bilirubin  1.4 mg/dL (0.2-1.0)  H  05/21/20  20:40    


 


AST  458 IU/L (10-42)  H  05/21/20  20:40    


 


ALT  136 IU/L (10-60)  H  05/21/20  20:40    


 


Alkaline Phosphatase  77 IU/L ()   05/21/20  20:40    


 


Total Creatine Kinase  46875 IU/L ()  H*  05/22/20  05:00    


 


Troponin I High Sens  84.5 ng/L (2.3-14.8)  H*  05/22/20  07:35    


 


B-Natriuretic Peptide  230 pg/mL (5-100)  H  05/21/20  20:40    


 


Total Protein  6.5 g/dL (6.7-8.2)  L  05/21/20  20:40    


 


Albumin  3.1 g/dL (3.2-5.5)  L  05/21/20  20:40    


 


Globulin  3.4 g/dL (2.1-4.2)   05/21/20  20:40    


 


Albumin/Globulin Ratio  0.9  (1.0-2.2)  L  05/21/20  20:40    


 


Lipase  28 U/L (22-51)   05/21/20  20:40    


 


Urine Color  YELLOW   05/21/20  21:19    


 


Urine Clarity  SL. CLOUDY  (CLEAR)   05/21/20  21:19    


 


Urine pH  6.5 PH (5.0-7.5)   05/21/20 21:19    


 


Ur Specific Gravity  1.015  (1.002-1.030)   05/21/20  21:19    


 


Urine Protein  100 mg/dL (NEGATIVE)  H  05/21/20  21:19    


 


Urine Glucose (UA)  NEGATIVE mg/dL (NEGATIVE)   05/21/20  21:19    


 


Urine Ketones  TRACE mg/dL (NEGATIVE)   05/21/20  21:19    


 


Urine Occult Blood  LARGE  (NEGATIVE)  H  05/21/20  21:19    


 


Urine Nitrite  NEGATIVE  (NEGATIVE)   05/21/20  21:19    


 


Urine Bilirubin  NEGATIVE  (NEGATIVE)   05/21/20  21:19    


 


Urine Urobilinogen  1 (NORMAL) E.U./dL (NORMAL)   05/21/20  21:19    


 


Ur Leukocyte Esterase  LARGE  (NEGATIVE)  H  05/21/20  21:19    


 


Urine RBC  6-10 /HPF (0-5)  H  05/21/20  21:19    


 


Urine WBC  >25 /HPF (0-5)  H  05/21/20  21:19    


 


Ur Squamous Epith Cells  RARE Squamous  (<= Few)   05/21/20  21:19    


 


Urine Bacteria  Moderate /HPF (None Seen)  H  05/21/20  21:19    


 


Urine Casts  3-5 Hyaline Casts /LPF  05/21/20  21:19    


 


Ur Microscopic Review  INDICATED   05/21/20  21:19    


 


Urine Culture Comments  INDICATED   05/21/20  21:19    


 


Nasal Screen MRSA (PCR)  NEGATIVE  (NEGATIVE)   05/22/20  00:01    


 


Urine Opiates Screen  NEGATIVE  (NEGATIVE)   05/21/20  21:19    


 


Ur Oxycodone Screen  NEGATIVE  (NEGATIVE)   05/21/20  21:19    


 


Urine Methadone Screen  NEGATIVE  (NEGATIVE)   05/21/20  21:19    


 


Ur Propoxyphene Screen  NEGATIVE  (NEGATIVE)   05/21/20  21:19    


 


Ur Barbiturates Screen  NEGATIVE  (NEGATIVE)   05/21/20  21:19    


 


Ur Tricyclics Screen  NEGATIVE  (NEGATIVE)   05/21/20  21:19    


 


Ur Phencyclidine Scrn  NEGATIVE  (NEGATIVE)   05/21/20  21:19    


 


Ur Amphetamine Screen  NEGATIVE  (NEGATIVE)   05/21/20  21:19    


 


U Methamphetamines Scrn  NEGATIVE  (NEGATIVE)   05/21/20  21:19    


 


U Benzodiazepines Scrn  NEGATIVE  (NEGATIVE)   05/21/20  21:19    


 


Urine Cocaine Screen  NEGATIVE  (NEGATIVE)   05/21/20  21:19    


 


U Cannabinoids Screen  NEGATIVE  (NEGATIVE)   05/21/20  21:19    


 


Ethyl Alcohol  < 5.0 mg/dL  05/21/20  20:40    














Sepsis Event Note (H)





- Evaluation


Current Stage of Sepsis: Sepsis


Possible source of Sepsis: positive: Genitourinary, Skin/soft tissue





- Sepsis Criteria


Sepsis Criteria: Recorded Respiratory Rate greater than 20, WBC count greater 

than 10% bands, SBP less than 90 mmHg, Metabolic: lactate > 2 mmol/L

## 2020-05-22 NOTE — XRAY REPORT
Reason:  Lateral decub abd, eval for free air

Procedure Date:  05/22/2020   

Accession Number:  [id number] / [id number]                    

Procedure:  XR  - Abdomen 1 View X-Ray CPT Code:  39656

 

***Final Report***

 

 

FULL RESULT:

 

 

EXAM:

ABDOMEN RADIOGRAPHY

 

EXAM DATE: 5/22/2020 10:48 AM.

 

CLINICAL HISTORY: Lateral decub abd, eval for free air.

 

COMPARISON: CHEST 1 VIEW 05/22/2020 10:19 AM

CHEST 1 VIEW 05/22/2020 8:50 AM

XR CHEST 1 VIEWS 03/22/2009 12:42 PM

CHEST 1 VIEW 05/21/2020 9:48 PM.

 

TECHNIQUE: 1 view.

 

FINDINGS:

Bowel Gas Pattern: Within normal limits. No dilated loops.

 

Other: No free air.

IMPRESSION: No free intraperitoneal air identified.

 

RADIA

 

The critical test notification system was initiated by Dr. Angeli Dewitt at 10:56 AM on 5/22/2020.

 

The above critical test findings  were discussed with Rubi Steele by 

Dr. Angeli Dewitt at 11:05 AM on 5/22/2020.

## 2020-05-22 NOTE — ANESTHESIA PROCEDURE NOTE
Anesthesia Intubation Template





- Intubation


Blade: positive: Glidescope


Tube: Size-enter number (7.5), Cuffed, Marked at teeth-enter cm (21)


Route: Oral


Placement Confirmation: End tidal CO2, Direct visualization (via glide), 

Bilateral breath sounds


Complications: No complications (Rocuronium 30mg, Propofol 50mg prior to 

intubation.)

## 2020-05-22 NOTE — PHARMACY PROGRESS NOTE
- Best Possible Medication History


Admit Date and Time: 05/21/20 2975


Processed by: Pharmacy


Medication History completed: Yes


Patient Interview: Pt unable to participate


Secondary Source(s): Physician records, Pharmacy records, Insurance records





As the person ultimately responsible for medication therapy, providers are able 

to order a medication from an existing home medication list in [facility] via the 

"Reconcile Routine" prior to Confirmation of that medication by support staff. 

Such practice is discouraged except when the physician, in their clinical 

judgment, deems that a medical need exists for a medication without regard to 

previous use.
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
## 2020-05-22 NOTE — XRAY REPORT
Reason:  Sudden SOB

Procedure Date:  05/22/2020   

Accession Number:  [id number] / [id number]                    

Procedure:  XR  - Chest 1 View X-Ray CPT Code:  59056

 

***Final Report***

 

 

FULL RESULT:

 

 

EXAM:

CHEST RADIOGRAPHY

 

EXAM DATE: 5/22/2020 09:09 AM.

 

CLINICAL HISTORY: Sudden SOB.

 

COMPARISON: CHEST 1 VIEW 05/21/2020 9:48 PM.

 

TECHNIQUE: 1 view.

 

FINDINGS:

Lungs/Pleura: Crescentic lucency projecting over the left heart shadow is 

incompletely assessed on this examination.

 

Mediastinum: Within exam limitations, the cardiomediastinal contour is 

normal.

 

Other: None.

IMPRESSION: Consider a PA and lateral chest radiograph to further 

evaluate the crescentic lucency projecting through the left lower 

hemithorax. Alternatively, a chest CT with contrast may help to further 

evaluate this finding. This is new from the prior examination. If there 

is concern for free air in the abdomen, a left lateral decubitus view may 

also be of benefit.

 

TANVIA

 

The critical result notification system was initiated by Dr. Angeli Dewitt at 09:22 AM on 5/22/2020.

 

The above critical result findings  were discussed with Rubi Steele by 

Dr. Angeli Dewitt at 09:27 AM on 5/22/2020.

## 2020-05-22 NOTE — XRAY REPORT
Reason:  new KJ @[facility]

Procedure Date:  05/22/2020   

Accession Number:  [id number] / [id number]                    

Procedure:  XR  - Chest for Line Placement CPT Code:  

 

***Final Report***

 

 

FULL RESULT:

 

 

EXAM:

CHEST RADIOGRAPHY

 

EXAM DATE: 5/22/2020 10:48 AM.

 

CLINICAL HISTORY: [facility].

 

COMPARISON: CHEST 1 VIEW 05/22/2020 8:50 AM.

 

TECHNIQUE: 1 view.

 

FINDINGS:

Lungs/Pleura: Large diaphragmatic hernia containing a loop of colon. The 

right costophrenic angle is excluded from the field-of-view.

 

Mediastinum: Within exam limitations, the cardiomediastinal contour is 

normal.

 

Other: There is a new right IJ approach intravenous catheter position 

with its tip in the lower SVC near the upper cavoatrial junction. The 

right costophrenic angle is excluded from the field-of-view and a 

pneumothorax cannot be excluded on this film.

IMPRESSION:

1. There is a new right IJ approach intravenous catheter position with 

its tip in the lower SVC near the upper cavoatrial junction.

2. The right costophrenic angle is excluded from the field-of-view and a 

pneumothorax cannot be excluded on this film.

3. Large diaphragmatic hernia containing a loop of colon.

 

RADIA

## 2020-05-22 NOTE — ANESTHESIA PROCEDURE NOTE
Anesth Central Line Template





- Central Line


Central Line Preparation: Consent Obtained (via daughter via telephone)


Central line location: Right IJ


Central line type: Triple lumen


Central line catheter tip site resides: Superior vena cava (SVC)


Central line aftercare: Chlorhexidine disc placed (Call to [phone] for CVL. 

Indications include bacteremia, sepsis, and hypotension.  Consent completed via 

telephone by RN prior to my arrival on unit.  Pt unresponsive to verbal 

commands, questions.  Pt placed flat and tilted Tberg for venous identification.

 Sterile prep and drape after gown, gloves, mask, hat. Sats and HR low upon 

arrival but droppped further after draping. NC swithched to NRB mask after Sats 

>80%. Sats stable at 85. Continued with procedure. R IJ identified w US.  Local 

wheal to site with 1% Lido. Needle to blood return, wire threaded without 

ectopy. Dilator over wire, cath over wire, wire out. Capsx3 placed, all aspirate

and flush blood easily.  Suture x1 instead of 3 @15cm due to drop in HR and Sats

and urgency of situation.  Drapes removed, tegaderm placed, call for PCXR. HOB 

returned to flat. Ok to use CVL following PCXR.), Secured, Placement confirmed, 

No pneumothorax, No complications, Bundle checklist complete, Pt tolerated well,

Other

## 2020-05-22 NOTE — XRAY REPORT
Reason:  NGT and ETT

Procedure Date:  05/22/2020   

Accession Number:  [id number] / [id number]                    

Procedure:  XR  - Chest for Line Placement CPT Code:  

 

***Final Report***

 

 

FULL RESULT:

 

 

EXAM:

CHEST RADIOGRAPHY

 

EXAM DATE: 5/22/2020 12:03 PM.

 

CLINICAL HISTORY: NGT and ETT.

 

COMPARISON: CHEST 1 VIEW 05/22/2020 10:19 AM.

 

TECHNIQUE: 1 view.

 

FINDINGS:

Lungs/Pleura: Groundglass opacities in the left perihilar station 

redemonstrated.

 

Mediastinum: Within exam limitations, the cardiomediastinal contour is 

normal.

 

Other: Large hiatal hernia containing a large portion of the stomach. The 

NG tube appears to be coiled within this portion of stomach in the chest. 

Severe atherosclerotic disease of the right and left shoulders.

IMPRESSION:

1. Large hiatal hernia containing a large portion of the stomach.

2. The NG tube appears to be coiled within the herniated portion of 

stomach in the chest.

3. The ET tube is positioned 5.5 cm above the lea.

 

RADIA

## 2020-05-22 NOTE — CONSULTATION NOTE
Consultation Report: 





Called to place [facility] after repeated low BP despite fluid, central line, head 

down, and max dose pressor (norepi).  Unable to identify R radial artery with 

ultrasound.  A scar runs parallel to the location of the R RAdial artery from 

the wrist and moves proximally about 5 inches. (previous surgery?) Sterile 

gloves, chlorhexadine prior to each attempt.  Attempt x2 at L radial artery with

ultrasound, unable to thread catheter.  Attempt x1 at L brachial artery, also 

unsuccessful.  Attempt x1 @R brachial artery, cath threaded, corresponding 

waveform present.  Secured with tegaderm and tape. Zero'd. 100/60.

## 2020-05-22 NOTE — PROVIDER PROGRESS NOTE
Hospitalist Cross-cover Note





- Cross-Cover Note


Cross-Cover Note: 





At approx 1015, her RN called me that she was in cardiopulmonary distress.


I arrived at bedside. The Anesthetist Antelmo was putting in a right sided IJ CVP 

line and the patient was in Trendelenburg.  Heart rate was sinus bradycardia at 

50 and blood pressure was not palpable.  Her skin was ashen-grey.  I called a 

CODE Blue/Rapid response.


Heart rate dropped into the 40s and 30s then 20s, still in sinus rhythm.  I 

ordered ambu bagging of the patient and ordered atropine 0.5 mg IV x1.


Antelmo of Anesthesia was able to finish the procedure and suture in and secure 

the right sided CVP line.


Blood pressure was cycled and was approximately 100 systolic.


Heart rate renee to 128 in sinus tachycardia, after the atropine was given by iv 

push.  Patient was continued to be bagged, she had minimal spontaneous 

respirations.


IV fluid bolus was ordered and discontinuation of the bear hugger. An axillary 

temp was measured and it was 37.5 degrees C.


Her heart rate started to drop into the 80s and then again 50s and 40s over the 

next 20 minutes, blood pressure again dropped and heart rate became 40s in sinus

rhythm.  Another half amp of iv atropine was ordered and given.


Heart rate renee to the 120s, blood pressure was about 100 systolic.  An arterial

blood gas was ordered and after 3 attempts could not be obtained by RT.


Elective intubation was ordered which was performed by Antelmo of the Anesthesia 

department successfully.


I reviewed the separate bedside x-rays that confirmed good CVP line placement, 

proper ET tube placement, good NG tube placement.


Blood pressure dropped into the 60s systolic and a Levophed drip was ordered, 

another saline bolus of 1000 cc was ordered.


An arterial line was ordered which was inserted by Antelmo of Anesthesia.


Her arterial blood gas returned showing pH of 6.9, PCO2 of 33, PO2 of 237 on 

FiO2 of 100%.  Her lactic acid level returned elevated at 7.4 and troponin 

rising at 245. 


I ordered 1 amp of Bicarb 50 mEq and decrease FIO2 to 60% and another ABG.


I called the daughter Olga and updated her on the above.  We will allow the 

for children to visit since she is in critical condition.





CRITICAL CARE TIME SPENT: 100 min (between 1015 and 1400)